# Patient Record
Sex: MALE | Race: WHITE | NOT HISPANIC OR LATINO | Employment: FULL TIME | URBAN - METROPOLITAN AREA
[De-identification: names, ages, dates, MRNs, and addresses within clinical notes are randomized per-mention and may not be internally consistent; named-entity substitution may affect disease eponyms.]

---

## 2017-07-14 ENCOUNTER — NON-PROVIDER VISIT (OUTPATIENT)
Dept: OCCUPATIONAL MEDICINE | Facility: CLINIC | Age: 27
End: 2017-07-14

## 2017-07-14 DIAGNOSIS — Z02.1 PRE-EMPLOYMENT DRUG SCREENING: ICD-10-CM

## 2017-07-14 PROCEDURE — 8898 PR URINE 11 PANEL - SEND TO LAB: Performed by: PREVENTIVE MEDICINE

## 2018-05-22 ENCOUNTER — HOSPITAL ENCOUNTER (OUTPATIENT)
Dept: LAB | Facility: MEDICAL CENTER | Age: 28
End: 2018-05-22
Attending: EMERGENCY MEDICINE
Payer: COMMERCIAL

## 2018-05-22 ENCOUNTER — OFFICE VISIT (OUTPATIENT)
Dept: URGENT CARE | Facility: CLINIC | Age: 28
End: 2018-05-22
Payer: COMMERCIAL

## 2018-05-22 VITALS
HEART RATE: 83 BPM | SYSTOLIC BLOOD PRESSURE: 106 MMHG | HEIGHT: 69 IN | DIASTOLIC BLOOD PRESSURE: 70 MMHG | RESPIRATION RATE: 16 BRPM | OXYGEN SATURATION: 96 % | BODY MASS INDEX: 23.7 KG/M2 | TEMPERATURE: 98.1 F | WEIGHT: 160 LBS

## 2018-05-22 DIAGNOSIS — Z20.2 STD EXPOSURE: ICD-10-CM

## 2018-05-22 DIAGNOSIS — N34.2 URETHRITIS: ICD-10-CM

## 2018-05-22 LAB
HIV 1+2 AB+HIV1 P24 AG SERPL QL IA: NON REACTIVE
TREPONEMA PALLIDUM IGG+IGM AB [PRESENCE] IN SERUM OR PLASMA BY IMMUNOASSAY: NON REACTIVE

## 2018-05-22 PROCEDURE — 99203 OFFICE O/P NEW LOW 30 MIN: CPT | Performed by: EMERGENCY MEDICINE

## 2018-05-22 PROCEDURE — 87491 CHLMYD TRACH DNA AMP PROBE: CPT

## 2018-05-22 PROCEDURE — 86780 TREPONEMA PALLIDUM: CPT

## 2018-05-22 PROCEDURE — 87389 HIV-1 AG W/HIV-1&-2 AB AG IA: CPT

## 2018-05-22 PROCEDURE — 36415 COLL VENOUS BLD VENIPUNCTURE: CPT

## 2018-05-22 PROCEDURE — 87591 N.GONORRHOEAE DNA AMP PROB: CPT

## 2018-05-22 RX ORDER — DOXYCYCLINE HYCLATE 100 MG
100 TABLET ORAL 2 TIMES DAILY
Qty: 14 TAB | Refills: 0 | Status: SHIPPED | OUTPATIENT
Start: 2018-05-22 | End: 2018-05-29

## 2018-05-22 RX ORDER — METHYLPHENIDATE HYDROCHLORIDE 10 MG/1
10 TABLET ORAL 2 TIMES DAILY
COMMUNITY
End: 2021-05-13

## 2018-05-22 RX ORDER — CEFTRIAXONE SODIUM 250 MG/1
250 INJECTION, POWDER, FOR SOLUTION INTRAMUSCULAR; INTRAVENOUS ONCE
Status: COMPLETED | OUTPATIENT
Start: 2018-05-22 | End: 2018-05-22

## 2018-05-22 RX ADMIN — CEFTRIAXONE SODIUM 250 MG: 250 INJECTION, POWDER, FOR SOLUTION INTRAMUSCULAR; INTRAVENOUS at 16:52

## 2018-05-22 ASSESSMENT — ENCOUNTER SYMPTOMS
VOMITING: 0
SORE THROAT: 0
ABDOMINAL PAIN: 0
FLANK PAIN: 0
NAUSEA: 0
FEVER: 0

## 2018-05-22 NOTE — PROGRESS NOTES
"Subjective:      Tim Armijo is a 27 y.o. male who presents with Exposure to STD (painful urination, discharge: green/yellow, x 2 days)            Exposure to STD   This is a new problem. Episode onset: one week ago. Pertinent negatives include no abdominal pain, fever, nausea, rash, sore throat or vomiting.   Dysuria    This is a new problem. Episode onset: 2 days. The problem occurs every urination. The quality of the pain is described as burning. The pain is mild. There has been no fever. He is sexually active. Associated symptoms include a discharge. Pertinent negatives include no flank pain, frequency, hematuria, hesitancy, nausea, urgency or vomiting. He has tried nothing for the symptoms.   Patient notes unprotected oral, anal, penile insertive sexual activity. No prior known STD.    Review of Systems   Constitutional: Negative for fever.   HENT: Negative for sore throat.    Gastrointestinal: Negative for abdominal pain, nausea and vomiting.   Genitourinary: Positive for dysuria. Negative for flank pain, frequency, hematuria, hesitancy and urgency.        Thick green yellow discharge   Skin: Negative for rash.     PMH:  has no past medical history on file.  MEDS:   Current Outpatient Prescriptions:   •  methylphenidate (RITALIN) 10 MG Tab, Take 10 mg by mouth 2 times a day., Disp: , Rfl:   •  doxycycline (VIBRAMYCIN) 100 MG Tab, Take 1 Tab by mouth 2 times a day for 7 days., Disp: 14 Tab, Rfl: 0    Current Facility-Administered Medications:   •  cefTRIAXone (ROCEPHIN) injection 250 mg, 250 mg, Intramuscular, Once, Florencio eMjia M.D.  ALLERGIES: Not on File  SURGHX: History reviewed. No pertinent surgical history.  SOCHX:  reports that he has never smoked. He has never used smokeless tobacco. He reports that he drinks alcohol. He reports that he does not use drugs.  FH: family history is not on file.       Objective:     /70   Pulse 83   Temp 36.7 °C (98.1 °F)   Resp 16   Ht 1.753 m (5' 9\")   Wt " 72.6 kg (160 lb)   SpO2 96%   BMI 23.63 kg/m²      Physical Exam   Constitutional: He appears well-developed and well-nourished. He is cooperative. He does not have a sickly appearance. He does not appear ill. No distress.   HENT:   Mouth/Throat: Oropharynx is clear and moist and mucous membranes are normal.   Cardiovascular: Normal rate, regular rhythm and normal heart sounds.    Pulmonary/Chest: Effort normal and breath sounds normal.   Abdominal: Soft. He exhibits no distension. There is no hepatosplenomegaly. There is no tenderness. There is no CVA tenderness.   Genitourinary: Testes normal. Rectal exam shows no external hemorrhoid and no fissure. Cremasteric reflex is present. Circumcised. No hypospadias. Discharge found.   Lymphadenopathy: No inguinal adenopathy noted on the right or left side.   Neurological: He is alert.   Skin: Skin is warm and dry. No rash noted.   Psychiatric: He has a normal mood and affect.               Assessment/Plan:     1. Urethritis  Advised avoidance of interpersonal sexual activity until testing negative  - cefTRIAXone (ROCEPHIN) injection 250 mg; 250 mg by Intramuscular route Once.  - doxycycline (VIBRAMYCIN) 100 MG Tab; Take 1 Tab by mouth 2 times a day for 7 days.  Dispense: 14 Tab; Refill: 0  - CHLAMYDIA/GC PCR URINE OR SWAB; Future    2. STD exposure  - HIV AG/AB COMBO ASSAY DIAGNOSTIC; Future  - RPR

## 2018-05-22 NOTE — PATIENT INSTRUCTIONS
Use an oral probiotic daily, such as Culturelle, Align, or yogurt to reduce gastrointestinal symptoms.  Urethritis, Adult  Urethritis is an inflammation of the tube through which urine exits your bladder (urethra).  What are the causes?  Urethritis is often caused by an infection in your urethra. The infection can be viral, like herpes. The infection can also be bacterial, like gonorrhea.  What increases the risk?  Risk factors of urethritis include:  · Having sex without using a condom.  · Having multiple sexual partners.  · Having poor hygiene.  What are the signs or symptoms?  Symptoms of urethritis are less noticeable in women than in men. These symptoms include:  · Burning feeling when you urinate (dysuria).  · Discharge from your urethra.  · Blood in your urine (hematuria).  · Urinating more than usual.  How is this diagnosed?  To confirm a diagnosis of urethritis, your health care provider will do the following:  · Ask about your sexual history.  · Perform a physical exam.  · Have you provide a sample of your urine for lab testing.  · Use a cotton swab to gently collect a sample from your urethra for lab testing.  How is this treated?  It is important to treat urethritis. Depending on the cause, untreated urethritis may lead to serious genital infections and possibly infertility. Urethritis caused by a bacterial infection is treated with antibiotic medicine. All sexual partners must be treated.  Follow these instructions at home:  · Do not have sex until the test results are known and treatment is completed, even if your symptoms go away before you finish treatment.  · If you were prescribed an antibiotic, finish it all even if you start to feel better.  Contact a health care provider if:  · Your symptoms are not improved in 3 days.  · Your symptoms are getting worse.  · You develop abdominal pain or pelvic pain (in women).  · You develop joint pain.  · You have a fever.  Get help right away if:  · You have  severe pain in the belly, back, or side.  · You have repeated vomiting.  This information is not intended to replace advice given to you by your health care provider. Make sure you discuss any questions you have with your health care provider.  Document Released: 06/13/2002 Document Revised: 05/25/2017 Document Reviewed: 08/18/2014  [employer] Interactive Patient Education © 2017 [employer]  Sexually Transmitted Disease  A sexually transmitted disease (STD) is a disease or infection that may be passed (transmitted) from person to person, usually during sexual activity. This may happen by way of saliva, semen, blood, vaginal mucus, or urine. Common STDs include:  · Gonorrhea.  · Chlamydia.  · Syphilis.  · HIV and AIDS.  · Genital herpes.  · Hepatitis B and C.  · Trichomonas.  · Human papillomavirus (HPV).  · Pubic lice.  · Scabies.  · Mites.  · Bacterial vaginosis.  What are the causes?  An STD may be caused by bacteria, a virus, or parasites. STDs are often transmitted during sexual activity if one person is infected. However, they may also be transmitted through nonsexual means. STDs may be transmitted after:  · Sexual intercourse with an infected person.  · Sharing sex toys with an infected person.  · Sharing needles with an infected person or using unclean piercing or tattoo needles.  · Having intimate contact with the genitals, mouth, or rectal areas of an infected person.  · Exposure to infected fluids during birth.  What are the signs or symptoms?  Different STDs have different symptoms. Some people may not have any symptoms. If symptoms are present, they may include:  · Painful or bloody urination.  · Pain in the pelvis, abdomen, vagina, anus, throat, or eyes.  · A skin rash, itching, or irritation.  · Growths, ulcerations, blisters, or sores in the genital and anal areas.  · Abnormal vaginal discharge with or without bad odor.  · Penile discharge in men.  · Fever.  · Pain or bleeding during sexual  intercourse.  · Swollen glands in the groin area.  · Yellow skin and eyes (jaundice). This is seen with hepatitis.  · Swollen testicles.  · Infertility.  · Sores and blisters in the mouth.  How is this diagnosed?  To make a diagnosis, your health care provider may:  · Take a medical history.  · Perform a physical exam.  · Take a sample of any discharge to examine.  · Swab the throat, cervix, opening to the penis, rectum, or vagina for testing.  · Test a sample of your first morning urine.  · Perform blood tests.  · Perform a Pap test, if this applies.  · Perform a colposcopy.  · Perform a laparoscopy.  How is this treated?  Treatment depends on the STD. Some STDs may be treated but not cured.  · Chlamydia, gonorrhea, trichomonas, and syphilis can be cured with antibiotic medicine.  · Genital herpes, hepatitis, and HIV can be treated, but not cured, with prescribed medicines. The medicines lessen symptoms.  · Genital warts from HPV can be treated with medicine or by freezing, burning (electrocautery), or surgery. Warts may come back.  · HPV cannot be cured with medicine or surgery. However, abnormal areas may be removed from the cervix, vagina, or vulva.  · If your diagnosis is confirmed, your recent sexual partners need treatment. This is true even if they are symptom-free or have a negative culture or evaluation. They should not have sex until their health care providers say it is okay.  · Your health care provider may test you for infection again 3 months after treatment.  How is this prevented?  Take these steps to reduce your risk of getting an STD:  · Use latex condoms, dental dams, and water-soluble lubricants during sexual activity. Do not use petroleum jelly or oils.  · Avoid having multiple sex partners.  · Do not have sex with someone who has other sex partners.  · Do not have sex with anyone you do not know or who is at high risk for an STD.  · Avoid risky sex practices that can break your skin.  · Do not  have sex if you have open sores on your mouth or skin.  · Avoid drinking too much alcohol or taking illegal drugs. Alcohol and drugs can affect your judgment and put you in a vulnerable position.  · Avoid engaging in oral and anal sex acts.  · Get vaccinated for HPV and hepatitis. If you have not received these vaccines in the past, talk to your health care provider about whether one or both might be right for you.  · If you are at risk of being infected with HIV, it is recommended that you take a prescription medicine daily to prevent HIV infection. This is called pre-exposure prophylaxis (PrEP). You are considered at risk if:  ¨ You are a man who has sex with other men (MSM).  ¨ You are a heterosexual man or woman and are sexually active with more than one partner.  ¨ You take drugs by injection.  ¨ You are sexually active with a partner who has HIV.  · Talk with your health care provider about whether you are at high risk of being infected with HIV. If you choose to begin PrEP, you should first be tested for HIV. You should then be tested every 3 months for as long as you are taking PrEP.  Contact a health care provider if:  · See your health care provider.  · Tell your sexual partner(s). They should be tested and treated for any STDs.  · Do not have sex until your health care provider says it is okay.  Get help right away if:  Contact your health care provider right away if:  · You have severe abdominal pain.  · You are a man and notice swelling or pain in your testicles.  · You are a woman and notice swelling or pain in your vagina.  This information is not intended to replace advice given to you by your health care provider. Make sure you discuss any questions you have with your health care provider.  Document Released: 03/09/2004 Document Revised: 07/07/2017 Document Reviewed: 07/08/2014  [employer] Interactive Patient Education © 2017 [employer]

## 2018-05-23 ENCOUNTER — TELEPHONE (OUTPATIENT)
Dept: URGENT CARE | Facility: CLINIC | Age: 28
End: 2018-05-23

## 2018-05-23 LAB
C TRACH DNA SPEC QL NAA+PROBE: NEGATIVE
N GONORRHOEA DNA SPEC QL NAA+PROBE: POSITIVE
SPECIMEN SOURCE: ABNORMAL

## 2018-05-24 ENCOUNTER — TELEPHONE (OUTPATIENT)
Dept: URGENT CARE | Facility: CLINIC | Age: 28
End: 2018-05-24

## 2018-05-24 NOTE — TELEPHONE ENCOUNTER
Patient called back regarding lab results, notified him of results and advised him to follow up with PCP

## 2018-05-24 NOTE — TELEPHONE ENCOUNTER
Please notify patient of positive test for gonorrhea, as expected.  Treatment was injection provided; continue Rx Doxycycline course.  Await blood test results. Remind to avoid interpersonal sexual activity until repeat testing negative.

## 2021-05-13 ENCOUNTER — OFFICE VISIT (OUTPATIENT)
Dept: URGENT CARE | Facility: CLINIC | Age: 31
End: 2021-05-13
Payer: COMMERCIAL

## 2021-05-13 ENCOUNTER — HOSPITAL ENCOUNTER (OUTPATIENT)
Facility: MEDICAL CENTER | Age: 31
End: 2021-05-13
Attending: NURSE PRACTITIONER
Payer: COMMERCIAL

## 2021-05-13 VITALS
RESPIRATION RATE: 16 BRPM | TEMPERATURE: 98.1 F | OXYGEN SATURATION: 96 % | WEIGHT: 191 LBS | HEART RATE: 95 BPM | BODY MASS INDEX: 28.29 KG/M2 | HEIGHT: 69 IN | DIASTOLIC BLOOD PRESSURE: 80 MMHG | SYSTOLIC BLOOD PRESSURE: 122 MMHG

## 2021-05-13 DIAGNOSIS — Z20.2 EXPOSURE TO STD: ICD-10-CM

## 2021-05-13 DIAGNOSIS — R36.9 PENILE DISCHARGE: ICD-10-CM

## 2021-05-13 LAB
APPEARANCE UR: CLEAR
BILIRUB UR STRIP-MCNC: NEGATIVE MG/DL
COLOR UR AUTO: NORMAL
GLUCOSE UR STRIP.AUTO-MCNC: NEGATIVE MG/DL
KETONES UR STRIP.AUTO-MCNC: NORMAL MG/DL
LEUKOCYTE ESTERASE UR QL STRIP.AUTO: NEGATIVE
NITRITE UR QL STRIP.AUTO: NORMAL
PH UR STRIP.AUTO: 7 [PH] (ref 5–8)
PROT UR QL STRIP: NEGATIVE MG/DL
RBC UR QL AUTO: NEGATIVE
SP GR UR STRIP.AUTO: 1.02
UROBILINOGEN UR STRIP-MCNC: 0.2 MG/DL

## 2021-05-13 PROCEDURE — 87591 N.GONORRHOEAE DNA AMP PROB: CPT

## 2021-05-13 PROCEDURE — 99203 OFFICE O/P NEW LOW 30 MIN: CPT | Mod: 25 | Performed by: NURSE PRACTITIONER

## 2021-05-13 PROCEDURE — 81002 URINALYSIS NONAUTO W/O SCOPE: CPT | Performed by: NURSE PRACTITIONER

## 2021-05-13 PROCEDURE — 87491 CHLMYD TRACH DNA AMP PROBE: CPT

## 2021-05-13 RX ORDER — DOXYCYCLINE HYCLATE 100 MG
100 TABLET ORAL 2 TIMES DAILY
Qty: 14 TABLET | Refills: 0 | Status: SHIPPED | OUTPATIENT
Start: 2021-05-13 | End: 2021-05-20

## 2021-05-13 RX ORDER — FLUTICASONE PROPIONATE 50 MCG
1 SPRAY, SUSPENSION (ML) NASAL DAILY
COMMUNITY

## 2021-05-13 RX ORDER — NAPROXEN SODIUM 220 MG
220 TABLET ORAL
COMMUNITY

## 2021-05-13 RX ORDER — DEXTROAMPHETAMINE SULFATE 5 MG/1
5 TABLET ORAL DAILY
COMMUNITY

## 2021-05-14 ENCOUNTER — TELEPHONE (OUTPATIENT)
Dept: URGENT CARE | Facility: CLINIC | Age: 31
End: 2021-05-14

## 2021-05-14 DIAGNOSIS — Z20.2 EXPOSURE TO STD: ICD-10-CM

## 2021-05-14 LAB
C TRACH DNA SPEC QL NAA+PROBE: NEGATIVE
N GONORRHOEA DNA SPEC QL NAA+PROBE: NEGATIVE
SPECIMEN SOURCE: NORMAL

## 2021-05-14 ASSESSMENT — ENCOUNTER SYMPTOMS
FEVER: 0
SHORTNESS OF BREATH: 0
VOMITING: 0
CHILLS: 0
DIZZINESS: 0
NAUSEA: 0
HEADACHES: 0
EYE REDNESS: 0
SORE THROAT: 0
MYALGIAS: 0

## 2021-05-15 NOTE — PROGRESS NOTES
"Subjective:   Tim Armijo is a 30 y.o. male who presents for Exposure to STD      Exposure to STD  This is a new problem. The current episode started in the past 7 days (Possible exposure to STD.  Did experience 1 day of penile discharge however this is since subsided..  Does have a history of gonorrhea.  Patient is homosexual). The problem occurs constantly. The problem has been unchanged. Pertinent negatives include no chest pain, chills, fever, headaches, myalgias, nausea, rash, sore throat, urinary symptoms or vomiting. Nothing aggravates the symptoms. He has tried nothing for the symptoms. The treatment provided moderate relief.       Review of Systems   Constitutional: Negative for chills and fever.   HENT: Negative for sore throat.    Eyes: Negative for redness.   Respiratory: Negative for shortness of breath.    Cardiovascular: Negative for chest pain.   Gastrointestinal: Negative for nausea and vomiting.   Genitourinary: Negative for dysuria.        X1 day penile discharge has resolved   Musculoskeletal: Negative for myalgias.   Skin: Negative for rash.   Neurological: Negative for dizziness and headaches.       Medications:    • dextroamphetamine Tabs  • doxycycline Tabs  • fluticasone  • naproxen    Allergies: Patient has no known allergies.    Problem List: Tim Armijo does not have a problem list on file.    Surgical History:  No past surgical history on file.    Past Social Hx: Tim Armijo  reports that he has never smoked. He has never used smokeless tobacco. He reports current alcohol use. He reports that he does not use drugs.     Past Family Hx:  Tim Armijo family history is not on file.     Problem list, medications, and allergies reviewed by myself today in Epic.     Objective:     /80   Pulse 95   Temp 36.7 °C (98.1 °F) (Temporal)   Resp 16   Ht 1.753 m (5' 9\")   Wt 86.6 kg (191 lb)   SpO2 96%   BMI 28.21 kg/m²     Physical Exam  Constitutional:       Appearance: " Normal appearance. He is not ill-appearing or toxic-appearing.   HENT:      Head: Normocephalic.      Right Ear: External ear normal.      Left Ear: External ear normal.      Nose: Nose normal.      Mouth/Throat:      Lips: Pink.      Mouth: Mucous membranes are moist.   Eyes:      General: Lids are normal.         Right eye: No discharge.         Left eye: No discharge.   Pulmonary:      Effort: Pulmonary effort is normal. No accessory muscle usage or respiratory distress.   Abdominal:      Tenderness: There is no right CVA tenderness or left CVA tenderness.   Genitourinary:     Comments: Deferred exam  Musculoskeletal:         General: Normal range of motion.      Cervical back: Full passive range of motion without pain.   Skin:     Coloration: Skin is not pale.   Neurological:      Mental Status: He is alert and oriented to person, place, and time.   Psychiatric:         Mood and Affect: Mood normal.         Thought Content: Thought content normal.         Assessment/Plan:     Diagnosis and associated orders:     1. Exposure to STD  CHLAMYDIA/GC PCR URINE OR SWAB    cefTRIAXone (ROCEPHIN) 500 mg, lidocaine (XYLOCAINE) 1 % 1.8 mL for IM use    POCT Urinalysis    doxycycline (VIBRAMYCIN) 100 MG Tab   2. Penile discharge        Comments/MDM:     Urinalysis negative    Patient is a 30-year-old male present with stated above, patient with unknown exposure to STI.  He did have 1 day of penile discharge.  Patient would like to be treated prophylactically for STI today as partner is also being treated.  We will follow-up with pending lab results and change treatment plan as indicated.  Differential diagnosis, natural history, supportive care, and indications for immediate follow-up discussed.         Please note that this dictation was created using voice recognition software. I have made a reasonable attempt to correct obvious errors, but I expect that there are errors of grammar and possibly content that I did not  tequila before finalizing the note.    This note was electronically signed by Colin MERRITT

## 2021-12-08 ENCOUNTER — OFFICE VISIT (OUTPATIENT)
Dept: URGENT CARE | Facility: CLINIC | Age: 31
End: 2021-12-08
Payer: COMMERCIAL

## 2021-12-08 ENCOUNTER — HOSPITAL ENCOUNTER (OUTPATIENT)
Facility: MEDICAL CENTER | Age: 31
End: 2021-12-08
Attending: NURSE PRACTITIONER
Payer: COMMERCIAL

## 2021-12-08 VITALS
RESPIRATION RATE: 14 BRPM | DIASTOLIC BLOOD PRESSURE: 78 MMHG | OXYGEN SATURATION: 96 % | SYSTOLIC BLOOD PRESSURE: 114 MMHG | WEIGHT: 183 LBS | TEMPERATURE: 97.2 F | BODY MASS INDEX: 27.74 KG/M2 | HEART RATE: 95 BPM | HEIGHT: 68 IN

## 2021-12-08 DIAGNOSIS — Z20.2 EXPOSURE TO GONORRHEA: ICD-10-CM

## 2021-12-08 DIAGNOSIS — Z72.52 HIGH RISK HOMOSEXUAL BEHAVIOR: ICD-10-CM

## 2021-12-08 PROBLEM — R41.840 DIFFICULTY CONCENTRATING: Status: ACTIVE | Noted: 2020-12-06

## 2021-12-08 PROCEDURE — 87491 CHLMYD TRACH DNA AMP PROBE: CPT

## 2021-12-08 PROCEDURE — 99213 OFFICE O/P EST LOW 20 MIN: CPT | Performed by: NURSE PRACTITIONER

## 2021-12-08 PROCEDURE — 87591 N.GONORRHOEAE DNA AMP PROB: CPT

## 2021-12-08 RX ORDER — DOXYCYCLINE HYCLATE 100 MG
100 TABLET ORAL 2 TIMES DAILY
Qty: 14 TABLET | Refills: 0 | Status: SHIPPED | OUTPATIENT
Start: 2021-12-08 | End: 2021-12-15

## 2021-12-08 ASSESSMENT — ENCOUNTER SYMPTOMS
NAUSEA: 0
CHILLS: 0
FEVER: 0
VOMITING: 0
FATIGUE: 0
MYALGIAS: 0
EYE PAIN: 0
ABDOMINAL PAIN: 0
SORE THROAT: 0
DIZZINESS: 0
SHORTNESS OF BREATH: 0
FLANK PAIN: 0

## 2021-12-09 NOTE — PROGRESS NOTES
"Subjective:   Tim Armijo is a 31 y.o. male who presents for Sexually Transmitted Diseases (screening.  Exposed to Positive Gonorrhea partner. )      Sexually Transmitted Diseases  This is a new problem. The current episode started in the past 7 days (exposed to gonorrhea ). The problem occurs constantly. The problem has been unchanged. Pertinent negatives include no abdominal pain, chest pain, chills, fatigue, fever, myalgias, nausea, rash, sore throat, urinary symptoms or vomiting. Nothing aggravates the symptoms. He has tried nothing for the symptoms. The treatment provided no relief.       Review of Systems   Constitutional: Negative for chills, fatigue and fever.   HENT: Negative for sore throat.    Eyes: Negative for pain.   Respiratory: Negative for shortness of breath.    Cardiovascular: Negative for chest pain.   Gastrointestinal: Negative for abdominal pain, nausea and vomiting.   Genitourinary: Negative for dysuria, flank pain, frequency, hematuria and urgency.   Musculoskeletal: Negative for myalgias.   Skin: Negative for rash.   Neurological: Negative for dizziness.       Medications:    • ceftriaxone (ROCEPHIN) 500 mg - lidocaine 1% 1.8 mL for IM use  • dextroamphetamine Tabs  • doxycycline Tabs  • fluticasone  • naproxen    Allergies: Patient has no known allergies.    Problem List: Tim Armijo does not have a problem list on file.    Surgical History:  No past surgical history on file.    Past Social Hx: Tim Armijo  reports that he has never smoked. He has never used smokeless tobacco. He reports current alcohol use. He reports that he does not use drugs.     Past Family Hx:  Tim Armijo family history is not on file.     Problem list, medications, and allergies reviewed by myself today in Epic.     Objective:     /78   Pulse 95   Temp 36.2 °C (97.2 °F) (Temporal)   Resp 14   Ht 1.727 m (5' 8\")   Wt 83 kg (183 lb)   SpO2 96%   BMI 27.83 kg/m²     Physical " Exam  Constitutional:       Appearance: Normal appearance. He is not ill-appearing or toxic-appearing.   HENT:      Head: Normocephalic.      Right Ear: External ear normal.      Left Ear: External ear normal.      Nose: Nose normal.      Mouth/Throat:      Lips: Pink.      Mouth: Mucous membranes are moist.   Eyes:      General: Lids are normal.         Right eye: No discharge.         Left eye: No discharge.   Pulmonary:      Effort: Pulmonary effort is normal. No accessory muscle usage or respiratory distress.   Genitourinary:     Comments: Deferred exam   Musculoskeletal:         General: Normal range of motion.      Cervical back: Full passive range of motion without pain.   Skin:     Coloration: Skin is not pale.   Neurological:      Mental Status: He is alert and oriented to person, place, and time.   Psychiatric:         Mood and Affect: Mood normal.         Thought Content: Thought content normal.         Assessment/Plan:     Diagnosis and associated orders:     1. Exposure to gonorrhea  CHLAMYDIA/GC PCR URINE OR SWAB    doxycycline (VIBRAMYCIN) 100 MG Tab    cefTRIAXone (ROCEPHIN) 500 mg, lidocaine (XYLOCAINE) 1 % 1.8 mL for IM use   2. High risk homosexual behavior          Comments/MDM:     I personally reviewed prior external notes and prior test results pertinent to today's visit.   Discussed management options, risks and benefits, and alternatives to treatment plan agreed upon.   Red flags discussed and indications to immediately call 911 or present to the Emergency Department.   Supportive care, differential diagnoses, and indications for immediate follow-up discussed with patient.    Patient expresses understanding and agrees to plan. Patient denies any other questions or concerns.            Please note that this dictation was created using voice recognition software. I have made a reasonable attempt to correct obvious errors, but I expect that there are errors of grammar and possibly content that  I did not discover before finalizing the note.    This note was electronically signed by Colin SHERWOOD.